# Patient Record
Sex: MALE | Race: WHITE | NOT HISPANIC OR LATINO | Employment: FULL TIME | URBAN - METROPOLITAN AREA
[De-identification: names, ages, dates, MRNs, and addresses within clinical notes are randomized per-mention and may not be internally consistent; named-entity substitution may affect disease eponyms.]

---

## 2024-04-15 ENCOUNTER — OFFICE VISIT (OUTPATIENT)
Dept: PRIMARY CARE | Facility: CLINIC | Age: 44
End: 2024-04-15
Payer: COMMERCIAL

## 2024-04-15 ENCOUNTER — APPOINTMENT (OUTPATIENT)
Dept: LAB | Facility: LAB | Age: 44
End: 2024-04-15
Payer: COMMERCIAL

## 2024-04-15 VITALS
DIASTOLIC BLOOD PRESSURE: 76 MMHG | WEIGHT: 200 LBS | HEART RATE: 77 BPM | SYSTOLIC BLOOD PRESSURE: 118 MMHG | BODY MASS INDEX: 29.62 KG/M2 | HEIGHT: 69 IN | OXYGEN SATURATION: 96 %

## 2024-04-15 DIAGNOSIS — R53.83 OTHER FATIGUE: ICD-10-CM

## 2024-04-15 DIAGNOSIS — G47.00 INSOMNIA, UNSPECIFIED TYPE: ICD-10-CM

## 2024-04-15 DIAGNOSIS — Z00.00 HEALTHCARE MAINTENANCE: Primary | ICD-10-CM

## 2024-04-15 DIAGNOSIS — R06.83 SNORING: ICD-10-CM

## 2024-04-15 DIAGNOSIS — B00.1 RECURRENT COLD SORES: ICD-10-CM

## 2024-04-15 PROBLEM — E78.5 HLD (HYPERLIPIDEMIA): Status: ACTIVE | Noted: 2024-04-15

## 2024-04-15 PROBLEM — L30.9 ECZEMATOUS DERMATITIS: Status: ACTIVE | Noted: 2024-04-15

## 2024-04-15 PROCEDURE — 99386 PREV VISIT NEW AGE 40-64: CPT | Performed by: STUDENT IN AN ORGANIZED HEALTH CARE EDUCATION/TRAINING PROGRAM

## 2024-04-15 PROCEDURE — 1036F TOBACCO NON-USER: CPT | Performed by: STUDENT IN AN ORGANIZED HEALTH CARE EDUCATION/TRAINING PROGRAM

## 2024-04-15 PROCEDURE — 99203 OFFICE O/P NEW LOW 30 MIN: CPT | Performed by: STUDENT IN AN ORGANIZED HEALTH CARE EDUCATION/TRAINING PROGRAM

## 2024-04-15 RX ORDER — VALACYCLOVIR HYDROCHLORIDE 1 G/1
TABLET, FILM COATED ORAL 2 TIMES DAILY
COMMUNITY
Start: 2019-09-30 | End: 2024-04-15 | Stop reason: SDUPTHER

## 2024-04-15 RX ORDER — VALACYCLOVIR HYDROCHLORIDE 1 G/1
TABLET, FILM COATED ORAL
Qty: 20 TABLET | Refills: 1 | Status: SHIPPED | OUTPATIENT
Start: 2024-04-15

## 2024-04-15 ASSESSMENT — PATIENT HEALTH QUESTIONNAIRE - PHQ9
SUM OF ALL RESPONSES TO PHQ9 QUESTIONS 1 AND 2: 0
2. FEELING DOWN, DEPRESSED OR HOPELESS: NOT AT ALL
1. LITTLE INTEREST OR PLEASURE IN DOING THINGS: NOT AT ALL

## 2024-04-15 NOTE — PROGRESS NOTES
Subjective   Patient ID: Maverick Almaguer is a 43 y.o. male who presents for Annual Exam.  Today he is accompanied by alone.     HPI  Health maintenance   Overall patient is doing well.   Immunization: Tdap 7/2021  Colon Cancer Screening: No family history  Diet: Regular  Exercise: 3-4 days/week  Tobacco: chewing tobacco   EtOH: Rarely/socially     2. Recurrent cold sores  The patient has a history of recurrent cold sores, typically experiencing outbreaks 1-5 times a year. These occurrences are often associated with factors such as inadequate sleep, dietary habits, stress levels, and periods of lack of sleep,. In the past, the patient has sought treatment from a dermatologist and was prescribed antiviral medication to be taken at the onset of symptoms. Considering the recurrent nature of the condition, the patient is now inquiring about the possibility of obtaining a refill for antiviral medication to manage symptoms during future outbreaks.  Requesting refill    3. Sleep Apnea:  The patient presents with complaints of poor sleep quality characterized by loud snoring and waking up feeling foggy with low energy levels. The patient expresses interest in discussing the possibility of undergoing a sleep study and potentially initiating treatment with continuous positive airway pressure (CPAP).    Current Outpatient Medications on File Prior to Visit   Medication Sig Dispense Refill    [DISCONTINUED] valACYclovir (Valtrex) 1 gram tablet Take by mouth twice a day.       No current facility-administered medications on file prior to visit.        No Known Allergies    Immunization History   Administered Date(s) Administered    Influenza, seasonal, injectable 10/22/2019, 10/20/2020    Pfizer Purple Cap SARS-CoV-2 05/12/2021, 06/02/2021    Tdap vaccine, age 7 year and older (BOOSTRIX, ADACEL) 10/22/2019, 07/18/2021         Review of Systems  All pertinent positive symptoms are included in the history of present illness.  All  "other systems have been reviewed and are negative and noncontributory to this patient's current ailments.     Objective   /76 (BP Location: Left arm, Patient Position: Sitting, BP Cuff Size: Large adult)   Pulse 77   Ht 1.753 m (5' 9\")   Wt 90.7 kg (200 lb)   SpO2 96%   BMI 29.53 kg/m²   BSA: 2.1 meters squared  No visits with results within 1 Month(s) from this visit.   Latest known visit with results is:   [facility] Encounter on 02/18/2020   Component Date Value Ref Range Status    Testosterone, Total, LC-MS/MS 02/18/2020 337  250 - 1,100 ng/dL Final    Comment: For additional information, please refer to  http://education."[employer]/faq/  [email or web address]  (This link is being provided for informational/  educational purposes only.)     This test was developed and its analytical performance  characteristics have been determined by [facility] [place]. It has  not been cleared or approved by the U.S. Food and Drug  Administration. This assay has been validated pursuant  to the CLIA regulations and is used for clinical  purposes.      Testosterone, Free 02/18/2020 89.3  35.0 - 155.0 pg/mL Final    Comment: This test was developed and its analytical performance  characteristics have been determined by [facility] [place] [place]. It has  not been cleared or approved by the U.S. Food and Drug  Administration. This assay has been validated pursuant  to the CLIA regulations and is used for clinical  purposes.      Sex Hormone Binding Globulin 02/18/2020 13  10 - 57 nmol/L Final    Comment: Test Performed by:  [facility]  [address] [place] [address]  : Francisco Reyes M.D. Ph.D.; CLIA# [id number]      Creatinine 02/18/2020 1.17  0.50 - 1.30 mg/dL Final    GLOMERULAR FILTRATION RATE-NON AFR* 02/18/2020 >60  >60 mL/min/1.73m2 Final    GLOMERULAR FILTRATION RATE-* " 02/18/2020 >60  >60 mL/min/1.73m2 Final    Comment:  CALCULATIONS OF ESTIMATED GFR ARE PERFORMED   USING THE MDRD STUDY EQUATION FOR THE   IDMS-TRACEABLE CREATININE METHODS.   CLIN CHEM 2007;53:766-72      Estradiol 02/18/2020 24  pg/mL Final    Comment:  Patients receiving more than 5 mg/day of biotin may have interference   in test results.  A sample should be taken no sooner than eight hours   after  previous dose. Contact [phone] for additional information.   REF VALUES  FOLLICULAR PHASE      MID CYCLE             LUTEAL PHASE          POSTMENOPAUSE         < 32  PREPUBERTY            < 20  FEMALE 10-18Y        8-110  MALE   10-18Y         < 20  ADULT MALE            < 40      Luteinizing Hormone 02/18/2020 3.4  IU/L Final    Comment: REF VALUES  FOLLICULAR        PHASE     1.5-10.0  MID-CYCLE      13.0-72.0  LUTEAL PHASE    0.5-13.0  MENOPAUSE      15.0-65.0  PREPUBERTY        0- 3.0  CHILDREN          0- 6.0  ADULT MALE      1.0- 9.0      Follicle Stimulating Hormone 02/18/2020 3.3  IU/L Final    Comment: REF VALUES  FOLLICULAR  2-12  MID-CYCLE     12-25  LUTEAL PHASE   2-12  MENOPAUSE       PREPUBERTY    50% ADULT  ADULT MALE     2-10  INFANTS        0-1      17-Hydroxyprogesterone 02/18/2020 85  42 - 196 ng/dL Final    Comment: **Includes data from J Clin Endocrinol Metab.    1991;73:674-686; J Clin Endocrinol Metab.    1989;69;2549-0884; J Clin Endocrinol Metab.    1994;78:226-270. Pediatr Res 1988;23:525-529.    MedLinePlus (accessed 6/16/14).     This test was developed and its analytical performance  characteristics have been determined by [facility] [place]. It has  not been cleared or approved by the U.S. Food and Drug  Administration. This assay has been validated pursuant  to the CLIA regulations and is used for clinical  purposes.      Prolactin 02/18/2020 29.2 (H)  4.0 - 18.0 ug/L Final       Physical Exam  CONSTITUTIONAL - well  nourished, well developed, looks like stated age, in no acute distress, not ill-appearing, and not tired appearing  SKIN - normal skin color and pigmentation  HEAD - no trauma, normocephalic  EYES - normal external exam  ENT - TM's intact, no injection, no signs of infection  NECK - supple without rigidity, no neck mass was observed, no thyromegaly or thyroid nodules  CHEST - clear to auscultation, no wheezing, no crackles and no rales, good effort  CARDIAC - regular rate and regular rhythm, no skipped beats, no murmur  ABDOMEN - no organomegaly, soft, nontender, nondistended, normal bowel sounds  EXTREMITIES - no edema, no deformities  NEUROLOGICAL - normal gait, normal balance  PSYCHIATRIC - alert, pleasant and cordial, age-appropriate  IMMUNOLOGIC - no cervical lymphadenopathy     Assessment/Plan   Health maintenance   Complete history and physical examination was performed  EKG was not performed at today's visit  We will notify of test results once available and make treatment recommendations accordingly  Please attempt to eat a well-balanced diet and exercise regularly  Colon cancer screening will start at the age of 45    2.  Recurrent cold sores   This was discussed in great detail and a prescription for valacyclovir (Valtrex) 1 gram tablets was provided.  The patient is instructed to take 2 tablets twice daily for 1 day at the onset of symptoms to manage outbreaks effectively.  Refill sent to your pharmacy    3. Insomnia/ snoring/ fatigue   Symptoms were addressed during the visit.   Further evaluation and management plans for these concerns were discussed with the patient to determine appropriate interventions and improve overall well-being.  An at home sleep study was sent to be done at your earliest mutual convenience  We will notify you of the results and make recommendations accordingly    Thank you for entrusting us with your healthcare needs.   Should you have any questions or concerns, please do not  hesitate to contact our office.

## 2024-10-21 DIAGNOSIS — B00.1 RECURRENT COLD SORES: ICD-10-CM

## 2024-10-21 RX ORDER — VALACYCLOVIR HYDROCHLORIDE 1 G/1
TABLET, FILM COATED ORAL
Qty: 20 TABLET | Refills: 1 | Status: SHIPPED | OUTPATIENT
Start: 2024-10-21

## 2025-07-07 ENCOUNTER — APPOINTMENT (OUTPATIENT)
Dept: PRIMARY CARE | Facility: CLINIC | Age: 45
End: 2025-07-07
Payer: COMMERCIAL

## 2025-07-07 VITALS
SYSTOLIC BLOOD PRESSURE: 106 MMHG | HEART RATE: 64 BPM | BODY MASS INDEX: 28.88 KG/M2 | OXYGEN SATURATION: 98 % | HEIGHT: 69 IN | DIASTOLIC BLOOD PRESSURE: 74 MMHG | WEIGHT: 195 LBS

## 2025-07-07 DIAGNOSIS — Z00.00 HEALTHCARE MAINTENANCE: Primary | ICD-10-CM

## 2025-07-07 DIAGNOSIS — Z13.29 SCREENING FOR THYROID DISORDER: ICD-10-CM

## 2025-07-07 DIAGNOSIS — Z13.0 SCREENING FOR DISORDER OF BLOOD AND BLOOD-FORMING ORGANS: ICD-10-CM

## 2025-07-07 DIAGNOSIS — Z12.5 PROSTATE CANCER SCREENING: ICD-10-CM

## 2025-07-07 DIAGNOSIS — Z13.1 SCREENING FOR DIABETES MELLITUS: ICD-10-CM

## 2025-07-07 DIAGNOSIS — B00.1 RECURRENT COLD SORES: ICD-10-CM

## 2025-07-07 DIAGNOSIS — R53.83 OTHER FATIGUE: ICD-10-CM

## 2025-07-07 DIAGNOSIS — Z13.6 SCREENING FOR HEART DISEASE: ICD-10-CM

## 2025-07-07 PROCEDURE — 1036F TOBACCO NON-USER: CPT | Performed by: STUDENT IN AN ORGANIZED HEALTH CARE EDUCATION/TRAINING PROGRAM

## 2025-07-07 PROCEDURE — 93000 ELECTROCARDIOGRAM COMPLETE: CPT | Performed by: STUDENT IN AN ORGANIZED HEALTH CARE EDUCATION/TRAINING PROGRAM

## 2025-07-07 PROCEDURE — 99213 OFFICE O/P EST LOW 20 MIN: CPT | Performed by: STUDENT IN AN ORGANIZED HEALTH CARE EDUCATION/TRAINING PROGRAM

## 2025-07-07 PROCEDURE — 99396 PREV VISIT EST AGE 40-64: CPT | Performed by: STUDENT IN AN ORGANIZED HEALTH CARE EDUCATION/TRAINING PROGRAM

## 2025-07-07 PROCEDURE — 3008F BODY MASS INDEX DOCD: CPT | Performed by: STUDENT IN AN ORGANIZED HEALTH CARE EDUCATION/TRAINING PROGRAM

## 2025-07-07 RX ORDER — VALACYCLOVIR HYDROCHLORIDE 1 G/1
TABLET, FILM COATED ORAL
Qty: 20 TABLET | Refills: 3 | Status: SHIPPED | OUTPATIENT
Start: 2025-07-07

## 2025-07-07 NOTE — PROGRESS NOTES
"Subjective   Patient ID: Maverick Almaguer is a 44 y.o. male who presents for Annual Exam.  Today he is accompanied by alone.     hospitals  Health maintenance   Overall patient is doing well.   Immunization: Tdap 7/2021  Colon Cancer Screening: No family history  Diet: Regular  Exercise: 3-4 days/week  Tobacco: chewing tobacco   EtOH: Rarely/socially     2. Recurrent cold sores  Has a history of recurrent cold sores  Has anywhere from 1 to a few outbreaks a year  Continues to use antiviral medication on as-needed basis  Requesting refills        Current Outpatient Medications on File Prior to Visit   Medication Sig Dispense Refill    valACYclovir (Valtrex) 1 gram tablet Take 2 tablets twice daily for 1 day on the onset of symptoms 20 tablet 1     No current facility-administered medications on file prior to visit.        No Known Allergies    Immunization History   Administered Date(s) Administered    COVID-19, mRNA, LNP-S, PF, 30 mcg/0.3 mL dose 05/12/2021, 06/02/2021    Influenza, seasonal, injectable 10/22/2019, 10/20/2020    Tdap vaccine, age 7 year and older (BOOSTRIX, ADACEL) 10/22/2019, 07/18/2021         Review of Systems  All pertinent positive symptoms are included in the history of present illness.  All other systems have been reviewed and are negative and noncontributory to this patient's current ailments.     Objective   /74 (BP Location: Left arm, Patient Position: Sitting, BP Cuff Size: Adult)   Pulse 64   Ht 1.753 m (5' 9\")   Wt 88.5 kg (195 lb)   SpO2 98%   BMI 28.80 kg/m²   BSA: 2.08 meters squared  No visits with results within 1 Month(s) from this visit.   Latest known visit with results is:   [facility] Encounter on 02/18/2020   Component Date Value Ref Range Status    Testosterone, Total, LC-MS/MS 02/18/2020 337  250 - 1,100 ng/dL Final    Comment: For additional information, please refer to  http://education.[employer]faq/  [id number]  (This link is being provided " for informational/  educational purposes only.)     This test was developed and its analytical performance  characteristics have been determined by [facility] [place], [place]. It has  not been cleared or approved by the U.S. Food and Drug  Administration. This assay has been validated pursuant  to the CLIA regulations and is used for clinical  purposes.      Testosterone, Free 02/18/2020 89.3  35.0 - 155.0 pg/mL Final    Comment: This test was developed and its analytical performance  characteristics have been determined by [facility] [place], [place]. It has  not been cleared or approved by the U.S. Food and Drug  Administration. This assay has been validated pursuant  to the CLIA regulations and is used for clinical  purposes.      Sex Hormone Binding Globulin 02/18/2020 13  10 - 57 nmol/L Final    Comment: Test Performed by:  [facility]  [address] [id number]  : Francisco Reyes M.D. Ph.D.; CLIA# [id number]      Creatinine 02/18/2020 1.17  0.50 - 1.30 mg/dL Final    GLOMERULAR FILTRATION RATE-NON AFR* 02/18/2020 >60  >60 mL/min/1.73m2 Final    GLOMERULAR FILTRATION RATE-* 02/18/2020 >60  >60 mL/min/1.73m2 Final    Comment:  CALCULATIONS OF ESTIMATED GFR ARE PERFORMED   USING THE MDRD STUDY EQUATION FOR THE   IDMS-TRACEABLE CREATININE METHODS.   CLIN CHEM 2007;53:766-72      Estradiol 02/18/2020 24  pg/mL Final    Comment:  Patients receiving more than 5 mg/day of biotin may have interference   in test results.  A sample should be taken no sooner than eight hours   after  previous dose. Contact [phone] for additional information.   REF VALUES  FOLLICULAR PHASE      MID CYCLE             LUTEAL PHASE          POSTMENOPAUSE         < 32  PREPUBERTY            < 20  FEMALE 10-18Y        8-110  MALE   10-18Y         < 20  ADULT MALE            < 40      Luteinizing  Hormone 02/18/2020 3.4  IU/L Final    Comment: REF VALUES  FOLLICULAR        PHASE     1.5-10.0  MID-CYCLE      13.0-72.0  LUTEAL PHASE    0.5-13.0  MENOPAUSE      15.0-65.0  PREPUBERTY        0- 3.0  CHILDREN          0- 6.0  ADULT MALE      1.0- 9.0      Follicle Stimulating Hormone 02/18/2020 3.3  IU/L Final    Comment: REF VALUES  FOLLICULAR  2-12  MID-CYCLE     12-25  LUTEAL PHASE   2-12  MENOPAUSE       PREPUBERTY    50% ADULT  ADULT MALE     2-10  INFANTS        0-1      17-Hydroxyprogesterone 02/18/2020 85  42 - 196 ng/dL Final    Comment: **Includes data from J Clin Endocrinol Metab.    1991;73:674-686; J Clin Endocrinol Metab.    1989;69;4206-1954; J Clin Endocrinol Metab.    1994;78:226-270. Pediatr Res 1988;23:525-529.    MedLinePlus (accessed 6/16/14).     This test was developed and its analytical performance  characteristics have been determined by [facility] [place], [place]. It has  not been cleared or approved by the U.S. Food and Drug  Administration. This assay has been validated pursuant  to the CLIA regulations and is used for clinical  purposes.      Prolactin 02/18/2020 29.2 (H)  4.0 - 18.0 ug/L Final       Physical Exam  CONSTITUTIONAL - well nourished, well developed, looks like stated age, in no acute distress, not ill-appearing, and not tired appearing  SKIN - normal skin color and pigmentation  HEAD - no trauma, normocephalic  EYES - normal external exam  ENT - TM's intact, no injection, no signs of infection  NECK - supple without rigidity, no neck mass was observed, no thyromegaly or thyroid nodules  CHEST - clear to auscultation, no wheezing, no crackles and no rales, good effort  CARDIAC - regular rate and regular rhythm, no skipped beats, no murmur  EXTREMITIES - no edema, no deformities  NEUROLOGICAL - normal gait, normal balance  PSYCHIATRIC - alert, pleasant and cordial, age-appropriate  IMMUNOLOGIC - no cervical lymphadenopathy     Assessment/Plan    Health maintenance   Complete history and physical examination was performed  EKG was not performed at today's visit  We will notify of test results once available and make treatment recommendations accordingly  Please attempt to eat a well-balanced diet and exercise regularly  Colon cancer screening will start at the age of 45    2.  Recurrent cold sores   This was discussed in great detail and a prescription for valacyclovir (Valtrex) 1 gram tablets was provided.  The patient is instructed to take 2 tablets twice daily for 1 day at the onset of symptoms to manage outbreaks effectively.  Refill sent to your pharmacy    3.  Fatigue  I did order testosterone free and total he will follow-up with this accordingly

## 2025-07-08 NOTE — RESULT ENCOUNTER NOTE
Cholesterol slightly elevated in regards to  so I recommend diet and exercise    Complete blood cell count shows no anemia    Hemoglobin A1c slightly elevated at 5.7%  I just recommend diet and exercise at this time    Prostate within normal limits alongside a normal thyroid    Sugar, kidneys, liver, electrolytes are all within normal limits    We are just waiting for the testosterone

## 2025-07-10 LAB
ALBUMIN SERPL-MCNC: 4.6 G/DL (ref 3.6–5.1)
ALP SERPL-CCNC: 48 U/L (ref 36–130)
ALT SERPL-CCNC: 15 U/L (ref 9–46)
ANION GAP SERPL CALCULATED.4IONS-SCNC: 9 MMOL/L (CALC) (ref 7–17)
AST SERPL-CCNC: 14 U/L (ref 10–40)
BASOPHILS # BLD AUTO: 41 CELLS/UL (ref 0–200)
BASOPHILS NFR BLD AUTO: 0.6 %
BILIRUB SERPL-MCNC: 0.4 MG/DL (ref 0.2–1.2)
BUN SERPL-MCNC: 12 MG/DL (ref 7–25)
CALCIUM SERPL-MCNC: 9.6 MG/DL (ref 8.6–10.3)
CHLORIDE SERPL-SCNC: 104 MMOL/L (ref 98–110)
CHOLEST SERPL-MCNC: 195 MG/DL
CHOLEST/HDLC SERPL: 4.4 (CALC)
CO2 SERPL-SCNC: 27 MMOL/L (ref 20–32)
CREAT SERPL-MCNC: 1.29 MG/DL (ref 0.6–1.29)
EGFRCR SERPLBLD CKD-EPI 2021: 70 ML/MIN/1.73M2
EOSINOPHIL # BLD AUTO: 124 CELLS/UL (ref 15–500)
EOSINOPHIL NFR BLD AUTO: 1.8 %
ERYTHROCYTE [DISTWIDTH] IN BLOOD BY AUTOMATED COUNT: 12.6 % (ref 11–15)
EST. AVERAGE GLUCOSE BLD GHB EST-MCNC: 117 MG/DL
EST. AVERAGE GLUCOSE BLD GHB EST-SCNC: 6.5 MMOL/L
GLUCOSE SERPL-MCNC: 81 MG/DL (ref 65–99)
HBA1C MFR BLD: 5.7 %
HCT VFR BLD AUTO: 48.4 % (ref 38.5–50)
HDLC SERPL-MCNC: 44 MG/DL
HGB BLD-MCNC: 16.3 G/DL (ref 13.2–17.1)
LDLC SERPL CALC-MCNC: 125 MG/DL (CALC)
LYMPHOCYTES # BLD AUTO: 1449 CELLS/UL (ref 850–3900)
LYMPHOCYTES NFR BLD AUTO: 21 %
MCH RBC QN AUTO: 33.1 PG (ref 27–33)
MCHC RBC AUTO-ENTMCNC: 33.7 G/DL (ref 32–36)
MCV RBC AUTO: 98.2 FL (ref 80–100)
MONOCYTES # BLD AUTO: 421 CELLS/UL (ref 200–950)
MONOCYTES NFR BLD AUTO: 6.1 %
NEUTROPHILS # BLD AUTO: 4865 CELLS/UL (ref 1500–7800)
NEUTROPHILS NFR BLD AUTO: 70.5 %
NONHDLC SERPL-MCNC: 151 MG/DL (CALC)
PLATELET # BLD AUTO: 313 THOUSAND/UL (ref 140–400)
PMV BLD REES-ECKER: 9 FL (ref 7.5–12.5)
POTASSIUM SERPL-SCNC: 4.3 MMOL/L (ref 3.5–5.3)
PROT SERPL-MCNC: 7.2 G/DL (ref 6.1–8.1)
PSA SERPL-MCNC: 0.44 NG/ML
RBC # BLD AUTO: 4.93 MILLION/UL (ref 4.2–5.8)
SODIUM SERPL-SCNC: 140 MMOL/L (ref 135–146)
TESTOST FREE SERPL-MCNC: 68.6 PG/ML (ref 35–155)
TESTOST SERPL-MCNC: 434 NG/DL (ref 250–1100)
TRIGL SERPL-MCNC: 146 MG/DL
TSH SERPL-ACNC: 1.87 MIU/L (ref 0.4–4.5)
WBC # BLD AUTO: 6.9 THOUSAND/UL (ref 3.8–10.8)